# Patient Record
Sex: MALE | Race: WHITE | NOT HISPANIC OR LATINO | Employment: FULL TIME | URBAN - METROPOLITAN AREA
[De-identification: names, ages, dates, MRNs, and addresses within clinical notes are randomized per-mention and may not be internally consistent; named-entity substitution may affect disease eponyms.]

---

## 2020-12-04 PROBLEM — M70.21 OLECRANON BURSITIS, RIGHT ELBOW: Status: ACTIVE | Noted: 2020-12-04

## 2020-12-04 PROBLEM — L03.113 CELLULITIS OF RIGHT UPPER EXTREMITY: Status: ACTIVE | Noted: 2020-12-04

## 2023-06-19 ENCOUNTER — TELEPHONE (OUTPATIENT)
Dept: SURGERY | Facility: CLINIC | Age: 43
End: 2023-06-19
Payer: COMMERCIAL

## 2023-06-19 NOTE — TELEPHONE ENCOUNTER
----- Message from Cornelia Pinto sent at 6/19/2023  9:15 AM CDT -----  Regarding: STAT Referral  Urvashi Quinn would like to refer the patient to Dr. Denson in the General Surgery department. The patients diagnosis is recurrent furuncle of buttocks w/ cellulitis. I have scanned the patients referral and records into media manager.     Please review referral and contact patient to schedule appointment. If there are no appointments available at this time, please advise and I will inform the referring clinic.    Thank you,   Cornelia Duff

## 2023-06-20 ENCOUNTER — OFFICE VISIT (OUTPATIENT)
Dept: SURGERY | Facility: CLINIC | Age: 43
End: 2023-06-20
Payer: COMMERCIAL

## 2023-06-20 VITALS — BODY MASS INDEX: 37.93 KG/M2 | WEIGHT: 295.44 LBS

## 2023-06-20 DIAGNOSIS — L05.01 PILONIDAL ABSCESS: Primary | ICD-10-CM

## 2023-06-20 PROCEDURE — 1159F PR MEDICATION LIST DOCUMENTED IN MEDICAL RECORD: ICD-10-PCS | Mod: CPTII,S$GLB,, | Performed by: SURGERY

## 2023-06-20 PROCEDURE — 99999 PR PBB SHADOW E&M-EST. PATIENT-LVL III: ICD-10-PCS | Mod: PBBFAC,,, | Performed by: SURGERY

## 2023-06-20 PROCEDURE — 1159F MED LIST DOCD IN RCRD: CPT | Mod: CPTII,S$GLB,, | Performed by: SURGERY

## 2023-06-20 PROCEDURE — 3008F PR BODY MASS INDEX (BMI) DOCUMENTED: ICD-10-PCS | Mod: CPTII,S$GLB,, | Performed by: SURGERY

## 2023-06-20 PROCEDURE — 1160F PR REVIEW ALL MEDS BY PRESCRIBER/CLIN PHARMACIST DOCUMENTED: ICD-10-PCS | Mod: CPTII,S$GLB,, | Performed by: SURGERY

## 2023-06-20 PROCEDURE — 1160F RVW MEDS BY RX/DR IN RCRD: CPT | Mod: CPTII,S$GLB,, | Performed by: SURGERY

## 2023-06-20 PROCEDURE — 99999 PR PBB SHADOW E&M-EST. PATIENT-LVL III: CPT | Mod: PBBFAC,,, | Performed by: SURGERY

## 2023-06-20 PROCEDURE — 3008F BODY MASS INDEX DOCD: CPT | Mod: CPTII,S$GLB,, | Performed by: SURGERY

## 2023-06-20 PROCEDURE — 99203 OFFICE O/P NEW LOW 30 MIN: CPT | Mod: S$GLB,,, | Performed by: SURGERY

## 2023-06-20 PROCEDURE — 99203 PR OFFICE/OUTPT VISIT, NEW, LEVL III, 30-44 MIN: ICD-10-PCS | Mod: S$GLB,,, | Performed by: SURGERY

## 2023-06-21 RX ORDER — ENOXAPARIN SODIUM 100 MG/ML
40 INJECTION SUBCUTANEOUS
Status: CANCELLED | OUTPATIENT
Start: 2023-06-22

## 2023-06-21 RX ORDER — CEFAZOLIN SODIUM 2 G/50ML
2 SOLUTION INTRAVENOUS
Status: CANCELLED | OUTPATIENT
Start: 2023-06-21

## 2023-06-22 NOTE — PROGRESS NOTES
History & Physical    SUBJECTIVE:     History of Present Illness:  Patient is a 43 y.o. male presents with a infected pilonidal cyst with significant induration and tenderness.    States it has been swelling more over the past several days.    Starting on antibiotics but has not resolve much.      Still at least a 4 cm area of induration and tenderness.    Recommend incision and drainage of this area possible Penrose drain placement.    Patient agrees to this plan.        Chief Complaint   Patient presents with    Cyst       Review of patient's allergies indicates:  No Known Allergies    No current facility-administered medications for this visit.     No current outpatient medications on file.     Facility-Administered Medications Ordered in Other Visits   Medication Dose Route Frequency Provider Last Rate Last Admin    albuterol-ipratropium 2.5 mg-0.5 mg/3 mL nebulizer solution 3 mL  3 mL Nebulization Once PRN Tico Villalta Jr., MD        ceFAZolin 2 g in dextrose 5 % in water (D5W) 5 % 100 mL IVPB (MB+)  2 g Intravenous On Call Procedure Manuel Denson MD        diphenhydrAMINE injection 12.5 mg  12.5 mg Intravenous Q6H PRN Tico Villalta Jr., MD        enoxaparin injection 40 mg  40 mg Subcutaneous On Call Procedure Manuel Denson MD   40 mg at 06/22/23 0922    HYDROmorphone injection 0.2 mg  0.2 mg Intravenous Q3 Min PRN Tico Villalta Jr., MD        HYDROmorphone injection 0.2 mg  0.2 mg Intravenous Q3 Min PRN Tico Villalta Jr., MD        lactated ringers infusion  125 mL/hr Intravenous Continuous Tico Villalta Jr., MD        meperidine (PF) injection 12.5 mg  12.5 mg Intravenous Once PRN Tico Villalta Jr., MD        ondansetron injection 4 mg  4 mg Intravenous Once PRN Tico Villalta Jr., MD        oxyCODONE 5 mg/5 mL solution 5 mg  5 mg Oral Once Tico Villalta Jr., MD        oxyCODONE immediate release tablet 5 mg  5 mg Oral Q3H PRN Tico Villalta Jr., MD         promethazine (PHENERGAN) 25 mg in dextrose 5 % (D5W) 50 mL IVPB  25 mg Intravenous Once Tico Villalta Jr., MD        sodium chloride 0.9% flush 3 mL  3 mL Intravenous PRN Tico Villalta Jr., MD           Past Medical History:   Diagnosis Date    Buerger's disease      Past Surgical History:   Procedure Laterality Date    APPENDECTOMY       No family history on file.  Social History     Tobacco Use    Smoking status: Unknown   Substance Use Topics    Alcohol use: Yes     Comment: occasional    Drug use: Yes     Types: Marijuana        Review of Systems:  Review of Systems   Constitutional:  Negative for appetite change, fatigue, fever and unexpected weight change.   HENT:  Negative for sore throat and trouble swallowing.    Eyes: Negative.    Respiratory:  Negative for cough, shortness of breath and wheezing.    Cardiovascular:  Negative for chest pain and leg swelling.   Gastrointestinal:  Negative for abdominal distention, abdominal pain, blood in stool, constipation, diarrhea, nausea and vomiting.   Endocrine: Negative.    Genitourinary: Negative.    Musculoskeletal:  Negative for back pain.   Skin:  Positive for color change. Negative for rash.        Tenderness to pilonidal area   Allergic/Immunologic: Negative.    Neurological: Negative.    Hematological: Negative.    Psychiatric/Behavioral:  Negative for confusion.      OBJECTIVE:     Vital Signs (Most Recent)        134 kg (295 lb 6.7 oz)     Physical Exam:  Physical Exam  Vitals and nursing note reviewed.   Constitutional:       Appearance: He is well-developed.   HENT:      Head: Normocephalic and atraumatic.   Cardiovascular:      Rate and Rhythm: Normal rate.      Heart sounds: Normal heart sounds.   Pulmonary:      Effort: Pulmonary effort is normal.   Abdominal:      General: Bowel sounds are normal. There is no distension.      Palpations: Abdomen is soft.      Tenderness: There is no abdominal tenderness.   Musculoskeletal:         General:  Normal range of motion.      Cervical back: Normal range of motion.   Skin:     General: Skin is warm and dry.      Capillary Refill: Capillary refill takes less than 2 seconds.             Comments: Tenderness and induration to pilonidal area, some erythema, 5 x 3 cm area   Neurological:      Mental Status: He is alert and oriented to person, place, and time.   Psychiatric:         Behavior: Behavior normal.     Laboratory  CBC: Reviewed  CMP: Reviewed        ASSESSMENT/PLAN:     43-year-old male with a pilonidal cyst with infection.    Recommend drainage in the OR.    All risks and benefits discussed

## 2024-05-03 ENCOUNTER — TELEPHONE (OUTPATIENT)
Dept: ORTHOPEDICS | Facility: CLINIC | Age: 44
End: 2024-05-03
Payer: COMMERCIAL

## 2024-05-03 NOTE — TELEPHONE ENCOUNTER
----- Message from Melany Watkins LPN sent at 5/3/2024  9:00 AM CDT -----    ----- Message -----  From: Nunu Bernabe  Sent: 5/3/2024   8:54 AM CDT  To: Samantha Marin Staff    Kei Green calling regarding Appointment Access. PT went tot he ED for a pain in the lower back area that runs to the left leg and was told in the ED to call in to be seen by Dr. Singh for the care, but the doctor is not listed in the book it for this care, please inform if the doctor treats back and spine, so the PT will know if he needs to be seen elsewhere, [phone]

## 2024-05-03 NOTE — TELEPHONE ENCOUNTER
Spoke with patient. Pt advise that unfortunately [facility] doesn't see any back, neck or spine problems. Pt verbalized understanding.

## 2024-05-03 NOTE — TELEPHONE ENCOUNTER
Spoke with pt, appointment scheduled. Pt verbalized acceptance   ----- Message from Romelia Sparks sent at 5/3/2024 11:07 AM CDT -----  Regarding: pt advice  Contact: [phone]  Pt calling in regards to going to [facility] and getting discharge, pt needing help with finding the best provider that specialize with spine./lumbar  Pls call

## 2024-05-06 NOTE — PROGRESS NOTES
DATE: 5/6/2024  PATIENT: Kei Green    Supervising Physician: Hector Antony M.D.    CHIEF COMPLAINT: left leg pain    HISTORY:  Kei Green is a 44 y.o. male here for initial evaluation of low back and left leg pain (Back - 6, Leg - 6).  The pain in the front of the left leg is what bothers him most.  The pain has been present for 3 weeks and started immediately after squatting. The patient describes the pain as shooting.  The pain is worse with walking and improved by nothing. There is positive associated numbness and tingling. There is positive subjective weakness. Pt was seen in the ED on 4/29/24 for similar complaints and was given steroids with a few days of relief    The patient denies myelopathic symptoms such as handwriting changes or difficulty with buttons/coins/keys. Denies perineal paresthesias, bowel/bladder dysfunction.    PAST MEDICAL/SURGICAL HISTORY:  Past Medical History:   Diagnosis Date    Buerger's disease      Past Surgical History:   Procedure Laterality Date    APPENDECTOMY      INCISION AND DRAINAGE, PILONIDAL CYST, COMPLEX  06/22/2023    INCISION AND DRAINAGE, PILONIDAL CYST, COMPLEX N/A 6/22/2023    Procedure: INCISION AND DRAINAGE,PILONIDAL CYST,COMPLEX;  Surgeon: Manuel Denson MD;  Location: [facility] OR;  Service: General;  Laterality: N/A;       Medications:   No current outpatient medications on file prior to visit.     No current facility-administered medications on file prior to visit.       Social History:   Social History     Socioeconomic History    Marital status: Single   Tobacco Use    Smoking status: Unknown   Substance and Sexual Activity    Alcohol use: Yes     Comment: occasional    Drug use: Yes     Types: Marijuana   Social History Narrative    ** Merged History Encounter **            REVIEW OF SYSTEMS:  Constitution: Negative. Negative for chills, fever and night sweats.   Cardiovascular: Negative for chest pain and syncope.   Respiratory: Negative for cough and  "shortness of breath.   Gastrointestinal: See HPI. Negative for nausea/vomiting. Negative for abdominal pain.  Genitourinary: See HPI. Negative for discoloration or dysuria.  Skin: Negative for dry skin, itching and rash.   Hematologic/Lymphatic: Negative for bleeding problem. Does not bruise/bleed easily.   Musculoskeletal: Negative for falls and muscle weakness.   Neurological: See HPI. No seizures.   Endocrine: Negative for polydipsia, polyphagia and polyuria.   Allergic/Immunologic: Negative for hives and persistent infections.     EXAM:  There were no vitals taken for this visit.    General: The patient is a very pleasant 44 y.o. male in no apparent distress, the patient is oriented to person, place and time.  Psych: Normal mood and affect  HEENT: Vision grossly intact, hearing intact to the spoken word.  Lungs: Respirations unlabored.  Gait: Antalgic station and gait  Skin: Dorsal lumbar skin negative for rashes, lesions, hairy patches and surgical scars. There is negative lumbar tenderness to palpation.  Range of motion: Lumbar range of motion is acceptable.  Spinal Balance: Global saggital and coronal spinal balance acceptable, not significant for scoliosis and kyphosis.  Musculoskeletal: No pain with the range of motion of the bilateral hips. No trochanteric tenderness to palpation.  Vascular: Bilateral lower extremities warm and well perfused, dorsalis pedis pulses 2+ bilaterally.  Neurological: 4/5 strength and tone in LLE compared to RLE. Diminished sensation to light touch in the L2-S1 dermatomes in LLE compared to RLE  Deep tendon reflexes symmetric 2+ in RLE, 1+ in LLE.  Negative Babinski bilaterally. Straight leg raise positive on left    IMAGING:      Today I personally reviewed AP, Lat and Flex/Ex  upright L-spine films that demonstrate degenerative changes      There is no height or weight on file to calculate BMI.    No results found for: "HGBA1C"        ASSESSMENT/PLAN:    There are no diagnoses " linked to this encounter.    Today we discussed at length all of the different treatment options including anti-inflammatories, acetaminophen, rest, ice, heat, physical therapy including strengthening and stretching exercises, home exercises, ROM, aerobic conditioning, aqua therapy, other modalities including ultrasound, massage, and dry needling, epidural steroid injections and finally surgical intervention.      Pt presents with acute lumbar radiculopathy with neuro deficits. Concern for acute disc herniation. Will obtain STAT MRI to further evaluate and call with results. Will send gabapentin to pharmacy

## 2024-05-07 ENCOUNTER — OFFICE VISIT (OUTPATIENT)
Dept: ORTHOPEDICS | Facility: CLINIC | Age: 44
End: 2024-05-07
Payer: COMMERCIAL

## 2024-05-07 VITALS — WEIGHT: 289.25 LBS | HEIGHT: 74 IN | BODY MASS INDEX: 37.12 KG/M2

## 2024-05-07 DIAGNOSIS — M54.9 DORSALGIA, UNSPECIFIED: Primary | ICD-10-CM

## 2024-05-07 PROCEDURE — 3008F BODY MASS INDEX DOCD: CPT | Mod: CPTII,S$GLB,, | Performed by: ORTHOPAEDIC SURGERY

## 2024-05-07 PROCEDURE — 99204 OFFICE O/P NEW MOD 45 MIN: CPT | Mod: S$GLB,,, | Performed by: ORTHOPAEDIC SURGERY

## 2024-05-07 PROCEDURE — 99999 PR PBB SHADOW E&M-EST. PATIENT-LVL III: CPT | Mod: PBBFAC,,, | Performed by: ORTHOPAEDIC SURGERY

## 2024-05-07 PROCEDURE — 1159F MED LIST DOCD IN RCRD: CPT | Mod: CPTII,S$GLB,, | Performed by: ORTHOPAEDIC SURGERY

## 2024-05-07 RX ORDER — GABAPENTIN 300 MG/1
300 CAPSULE ORAL 3 TIMES DAILY
Qty: 90 CAPSULE | Refills: 11 | Status: SHIPPED | OUTPATIENT
Start: 2024-05-07 | End: 2025-05-07

## 2024-05-08 ENCOUNTER — TELEPHONE (OUTPATIENT)
Dept: PAIN MEDICINE | Facility: CLINIC | Age: 44
End: 2024-05-08
Payer: COMMERCIAL

## 2024-05-08 DIAGNOSIS — M54.16 LUMBAR RADICULOPATHY: Primary | ICD-10-CM

## 2024-05-08 DIAGNOSIS — M51.26 LUMBAR DISC HERNIATION: Primary | ICD-10-CM

## 2024-05-08 NOTE — TELEPHONE ENCOUNTER
----- Message from Fox Osorio Jr., MD sent at 5/8/2024 12:09 PM CDT -----  Regarding: RE: Direct to procedure  Ok to schedule for Left L3 and L4 TFESI. Thanks.  ----- Message -----  From: Сергей Dominguez LPN  Sent: 5/8/2024  11:17 AM CDT  To: Fox Osorio Jr., MD  Subject: Direct to procedure                              Direct to procedure request received from Ally Soria PA-C for a STAT Transforaminal Injection Left L3-4 L4-5, Dx: Lumbar radiculopathy. Please, review chart notes and imaging. Advise for scheduling procedure.

## 2024-05-08 NOTE — TELEPHONE ENCOUNTER
Spoke with patient. Discussed a STAT LEFT L3 AND L4 TFESI. Confirmed with patient he will be able to do the procedure tomorrow.     Reviewed pre op instructions with patient:    Please leave jewelry and valuables at home or give them to your escort for safe keeping.  You will be required to have an adult to escort you after the procedure is over. Although we will not be sedating you for your procedure we ask for an escort for safety after the procedure.  Do not take over the counter blood thinning medications beginning 7 days before the procedure (aspirin, Motrin, ibuprofen, Advil, Aleve, naproxen). If you are taking prescribed thinners (Coumadin, warfarin, apixaban, Eliquis, Plavix, clopidogrel, Pletal, etc) we will obtain cardiac clearance from your cardiologist or provider that is monitoring your medications and levels to hold the medications if appropriate.  Cleanse your skin the evening before with an antibacterial soap (Dial or Hibiclens) and then repeat it again the morning of the procedure.  Do not apply lotions, creams, deodorants, perfumes, or colognes the day of the procedure.  You will be contacted the day before the procedure between 12-3p to be given the time to arrive the day of the procedure. If you are not contacted by the afternoon before the procedure you should contact [phone].  A light meal can be eaten up to 6 hours prior to the time to arrive for the procedure.    Patient verbalized understanding of the instructions provided to them and clinic contact number was provided for any further questions they may have.

## 2024-05-09 ENCOUNTER — TELEPHONE (OUTPATIENT)
Dept: PAIN MEDICINE | Facility: CLINIC | Age: 44
End: 2024-05-09
Payer: COMMERCIAL

## 2024-05-09 NOTE — TELEPHONE ENCOUNTER
Spoke with patient. He was updated on the authorization for the procedure. At this time the staff is still working with the insurance to get this addressed for the procedure today. He will be called again to let him know if we will be able to proceed today. Verbalized understanding. No further issues discussed.